# Patient Record
Sex: MALE | Race: AMERICAN INDIAN OR ALASKA NATIVE | NOT HISPANIC OR LATINO | URBAN - METROPOLITAN AREA
[De-identification: names, ages, dates, MRNs, and addresses within clinical notes are randomized per-mention and may not be internally consistent; named-entity substitution may affect disease eponyms.]

---

## 2017-01-19 ENCOUNTER — INPATIENT (INPATIENT)
Facility: HOSPITAL | Age: 32
LOS: 0 days | Discharge: HOME | End: 2017-01-20
Attending: EMERGENCY MEDICINE

## 2017-06-27 DIAGNOSIS — R07.9 CHEST PAIN, UNSPECIFIED: ICD-10-CM

## 2017-06-27 DIAGNOSIS — R07.89 OTHER CHEST PAIN: ICD-10-CM

## 2017-06-27 DIAGNOSIS — R20.0 ANESTHESIA OF SKIN: ICD-10-CM

## 2017-06-27 DIAGNOSIS — Z82.49 FAMILY HISTORY OF ISCHEMIC HEART DISEASE AND OTHER DISEASES OF THE CIRCULATORY SYSTEM: ICD-10-CM

## 2017-06-27 DIAGNOSIS — R11.0 NAUSEA: ICD-10-CM

## 2017-06-27 DIAGNOSIS — R20.2 PARESTHESIA OF SKIN: ICD-10-CM

## 2021-06-06 ENCOUNTER — OUTPATIENT (OUTPATIENT)
Dept: OUTPATIENT SERVICES | Facility: HOSPITAL | Age: 36
LOS: 1 days | Discharge: HOME | End: 2021-06-06

## 2021-06-06 DIAGNOSIS — Z11.59 ENCOUNTER FOR SCREENING FOR OTHER VIRAL DISEASES: ICD-10-CM

## 2022-02-20 ENCOUNTER — EMERGENCY (EMERGENCY)
Facility: HOSPITAL | Age: 37
LOS: 0 days | Discharge: HOME | End: 2022-02-20
Attending: STUDENT IN AN ORGANIZED HEALTH CARE EDUCATION/TRAINING PROGRAM | Admitting: STUDENT IN AN ORGANIZED HEALTH CARE EDUCATION/TRAINING PROGRAM
Payer: MEDICAID

## 2022-02-20 VITALS
RESPIRATION RATE: 16 BRPM | OXYGEN SATURATION: 99 % | HEART RATE: 71 BPM | WEIGHT: 205.91 LBS | SYSTOLIC BLOOD PRESSURE: 148 MMHG | DIASTOLIC BLOOD PRESSURE: 90 MMHG | TEMPERATURE: 98 F

## 2022-02-20 DIAGNOSIS — H53.149 VISUAL DISCOMFORT, UNSPECIFIED: ICD-10-CM

## 2022-02-20 DIAGNOSIS — H16.002 UNSPECIFIED CORNEAL ULCER, LEFT EYE: ICD-10-CM

## 2022-02-20 DIAGNOSIS — S05.02XA INJURY OF CONJUNCTIVA AND CORNEAL ABRASION WITHOUT FOREIGN BODY, LEFT EYE, INITIAL ENCOUNTER: ICD-10-CM
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
## 2022-02-20 PROCEDURE — 99283 EMERGENCY DEPT VISIT LOW MDM: CPT

## 2022-02-20 RX ORDER — POLYMYXIN B SULF/TRIMETHOPRIM 10000-1/ML
2 DROPS OPHTHALMIC (EYE) ONCE
Refills: 0 | Status: COMPLETED | OUTPATIENT
Start: 2022-02-20 | End: 2022-02-20

## 2022-02-20 RX ORDER — IBUPROFEN 200 MG
600 TABLET ORAL ONCE
Refills: 0 | Status: COMPLETED | OUTPATIENT
Start: 2022-02-20 | End: 2022-02-20

## 2022-02-20 RX ORDER — CIPROFLOXACIN HCL 0.3 %
2 DROPS OPHTHALMIC (EYE)
Qty: 1 | Refills: 1
Start: 2022-02-20 | End: 2022-03-19

## 2022-02-20 RX ADMIN — Medication 2 DROP(S): at 07:35

## 2022-02-20 RX ADMIN — Medication 600 MILLIGRAM(S): at 08:03

## 2022-02-20 NOTE — ED PROVIDER NOTE - CLINICAL SUMMARY MEDICAL DECISION MAKING FREE TEXT BOX
36-year-old male past medical history of sarcoidosis presents today with left-sided photophobia. patient notes often denies any nausea vomiting no history of increased intracranial pressure. Patient noted that he had an episode of intraocular pressure was relieved with timolol. Well appearing, NAD, non toxic. NCAT PERRLA EOMI, corneal ulcer noted on fluorescein exam 1.5 mm. Neck supple non tender normal wob cta bl rrr abdomen s nt nd no rebound no guarding WWPx4 neuro non focal. Will discharge antibiotics Ortho follow-up

## 2022-02-20 NOTE — ED PROVIDER NOTE - OBJECTIVE STATEMENT
Pt is a 37 yo M w/ pmhx of sarcoidosis presenting w/ photophobia and eye pressure for the past few days. Denies any trauma to the eye. 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  He was treated w/ eye drops in the past. He denies any HA or vision loss.

## 2022-02-20 NOTE — ED ADULT NURSE NOTE - OBJECTIVE STATEMENT
PT STATES THAT THEY HAVE A PAST MEDICAL HISTORY OF SARCOIDOSIS. PT STATES THAT HE IS CURRENTLY HAVING LIGHT SENSITIVITY IN OS. PT DENIES N/V/D AND FEVER. PT STATES THAT THEY HAVE A PAST MEDICAL HISTORY OF SARCOIDOSIS. PT STATES THAT HE IS CURRENTLY HAVING LIGHT SENSITIVITY IN OS. PT DENIES N/V/D AND FEVER. PT STATES THAT HE HAS HX OF INCREASED IOP OS.

## 2022-02-20 NOTE — ED PROVIDER NOTE - PATIENT PORTAL LINK FT
You can access the FollowMyHealth Patient Portal offered by [facility] by registering at the following website: [email or web address]. By joining [employer]’s FollowMyHealth portal, you will also be able to view your health information using other applications (apps) compatible with our system.

## 2022-02-20 NOTE — ED PROVIDER NOTE - PHYSICAL EXAMINATION
CONSTITUTIONAL: Well-developed; well-nourished; in no acute distress.   SKIN: warm, dry  HEAD: Normocephalic; atraumatic.  EYES: PERRL, EOMI, normal sclera and conjunctiva, + tearing in L eye .   Lev pressure: L eye 9,9 8 R eye 10, 10 ,9  flourescein exam : + corneal abrasion w/ jagged edges and borders   ENT: No nasal discharge; airway clear.  NECK: Supple; non tender.  CARD:  Regular rate and rhythm.   RESP: NO inc WOB , speaking in full sentences, lungs CTAB  ABD: soft ntnd  EXT: Normal ROM.  no LE edema no unilateral leg swelling  NEURO: Alert, oriented, grossly unremarkable  PSYCH: Cooperative, appropriate.

## 2022-02-20 NOTE — ED PROVIDER NOTE - NSFOLLOWUPCLINICS_GEN_ALL_ED_FT
[facility]  Ophthalmolgy  [address], [address]  [place] [address]  Phone: [phone]  Fax:   Follow Up Time: Urgent

## 2022-02-20 NOTE — ED PROVIDER NOTE - NSFOLLOWUPINSTRUCTIONS_ED_ALL_ED_FT
Can take motrin/ibuprofen 600mg every 6 hours as needed       Corneal Ulcer    A corneal ulcer is an open sore on the cornea. The cornea is the clear covering at the front and center of the eye.     CAUSES  Most corneal ulcers are caused by infection, but there are other causes as well.    Bacterial infection. A bacterial infection can occur and cause a corneal ulcer if:  Contact lenses are worn too long (especially overnight) or are not properly cared for.  An eye injury occurs, allowing bacteria to infect the area of injury.  Viral infection. A viral infection can occur and cause a corneal ulcer if:  The eye becomes infected with a virus, such as the herpes simplex (cold sore) virus, chickenpox virus, or shingles virus.  Fungal infection. A fungal infection can occur and cause a corneal ulcer if:  An eye injury resulted from contact with a plant or plant material.  An anti-inflammatory eye drop is overused.  You have a weakened immune system.  Contact lenses are improperly cared for or become infected.  Foreign bodies in the eye, such as sand, glass, or small pieces of glass or metal.   Dry eyes.  Certain disorders that prevent eyelids from closing completely, such as Bell's palsy.  Contact lenses, especially extended-wear soft contact lenses. Contact lenses can:  Scratch the cornea's surface, allowing bacteria to enter the scratch.  Trap dirt underneath the contact lens, which can scratch the cornea.  Chamberlain bacteria and fungi, making it more likely for bacterial infections to occur.  Block oxygen from the cornea, making it more likely for infections to occur.    SYMPTOMS  Eye pain that is often severe.  Blurry vision.  Light sensitivity.  Pus or thick discharge coming from your eye.  Eye redness.  Feeling like something is in your eye.  Watery or itchy eye.  Burning or stinging feeling.    Some ulcers that are very big may be seen as a white spot on the cornea.    DIAGNOSIS  An eye exam will be performed. Your health care provider may use a special kind of microscope (slit lamp) to look at the cornea. Eye drops may be put into the eye to make the ulcer easier to see. If it is suspected that an infection caused the corneal ulcer, tissue samples or cultures from the eye may be taken. Numbing eye drops will be given before any samples or cultures are taken. The samples or cultures will be examined in the lab to check for bacteria, viruses, or fungi.    TREATMENT  Treatment of the corneal ulcer depends on the cause. If your ulcer is severe, you may be given antibiotic eye drops up until your health care provider knows the test results. Other treatments can include:    Antibacterial, antiviral, or antifungal eye drops or ointment.  Removing the foreign body that caused the eye injury.  Artificial tears or a bandage contact lens if severe dry eyes caused the corneal ulcer.  Over-the-counter or prescription pain medicine.  Steroidal eye drops if the eye is inflamed and swollen.  Antibiotic medicines by mouth.  An injection of medicine under the thin membrane covering the eyeball (conjunctiva). This allows medicine to reach the ulcer in high doses.  Eye patching to reduce irritation from blinking and bright light. An eye patch may not be given if the ulcer was caused by a bacterial infection.    If the corneal ulcer causes a scar on the cornea that interferes with vision, hospitalization and surgery may be needed to replace the cornea (corneal transplant).    HOME CARE INSTRUCTIONS  If prescribed, use your antibiotic pills, eye drops, or ointment as directed. Continue using them even if you start to feel better. You may have to apply eye drops as often as every few minutes to every hour, for days. It may be necessary to set your alarm clock every few minutes to every hour during the night. This is absolutely necessary.   Only take over-the-counter or prescription medicines as directed by your health care provider.   Apply artificial tears as needed if you have dry eyes.   Do not touch or rub your eye, because this may increase the irritation and spread the infection.  Avoid wearing eye makeup.  Stay in a dark room and use sunglasses if you have light sensitivity.  Apply cool packs to your eye to relieve discomfort and swelling.  If your eye is patched, you should not drive or use machinery. You will have reduced side vision and ability to  distance.  Do not drive or operate machinery until approved by your health care provider. Your ability to  distances is impaired.  Follow up with your health care provider as directed.   Do not wear contact lenses until your health care provider approves. If you normally wear contact lenses, follow these general rules to avoid the risk of a corneal ulcer:   Do not wear contact lenses while you sleep.   Wash your hands before removing contact lenses.  Properly sterilize and store your contact lenses.  Regularly clean your contact lens case.  Do not use your saliva or tap water to clean or wet your contact lenses.  Remove your contact lenses if your eye becomes irritated. You may put them back in once your eyes feel better.    SEEK IMMEDIATE MEDICAL CARE IF:  You notice a change in your vision.  Your pain is getting worse, not better.   You have increasing discharge from the eye.    MAKE SURE YOU:  Understand these instructions.  Will watch your condition.  Will get help right away if you are not doing well or get worse.    ADDITIONAL NOTES AND INSTRUCTIONS    Please follow up with your Primary MD in 24-48 hr.  Seek immediate medical care for any new/worsening signs or symptoms.

## 2022-02-20 NOTE — ED PROVIDER NOTE - NS ED ROS FT
Constitutional: (-) fever  Eyes/ENT: ((-) epistaxis  Cardiovascular: (-) chest pain, (-) syncope  Respiratory: (-) cough, (-) shortness of breath  Gastrointestinal: (-) vomiting, (-) diarrhea  Musculoskeletal: (-) neck pain, (-) back pain, (-) joint pain  Integumentary: (-) rash, (-) edema  Neurological: (-) headache, (-) altered mental status  Psychiatric: (-) hallucinations  Allergic/Immunologic: (-) pruritus

## 2022-02-24 PROBLEM — Z00.00 ENCOUNTER FOR PREVENTIVE HEALTH EXAMINATION: Status: ACTIVE | Noted: 2022-02-24

## 2022-02-25 ENCOUNTER — APPOINTMENT (OUTPATIENT)
Dept: OPHTHALMOLOGY | Facility: CLINIC | Age: 37
End: 2022-02-25

## 2022-02-25 ENCOUNTER — OUTPATIENT (OUTPATIENT)
Dept: OUTPATIENT SERVICES | Facility: HOSPITAL | Age: 37
LOS: 1 days | Discharge: HOME | End: 2022-02-25
Payer: MEDICAID

## 2022-02-25 PROCEDURE — 92002 INTRM OPH EXAM NEW PATIENT: CPT

## 2022-03-04 DIAGNOSIS — H30.20 POSTERIOR CYCLITIS, UNSPECIFIED EYE: ICD-10-CM

## 2022-03-04 DIAGNOSIS — H25.10 AGE-RELATED NUCLEAR CATARACT, UNSPECIFIED EYE: ICD-10-CM

## 2022-03-04 DIAGNOSIS — H04.129 DRY EYE SYNDROME OF UNSPECIFIED LACRIMAL GLAND: ICD-10-CM

## 2022-04-29 ENCOUNTER — LABORATORY RESULT (OUTPATIENT)
Age: 37
End: 2022-04-29

## 2025-04-10 ENCOUNTER — NON-APPOINTMENT (OUTPATIENT)
Age: 40
End: 2025-04-10

## 2025-04-10 ENCOUNTER — APPOINTMENT (OUTPATIENT)
Facility: CLINIC | Age: 40
End: 2025-04-10
Payer: MEDICAID

## 2025-04-10 PROCEDURE — 92134 CPTRZ OPH DX IMG PST SGM RTA: CPT

## 2025-04-10 PROCEDURE — 92004 COMPRE OPH EXAM NEW PT 1/>: CPT

## 2025-04-28 ENCOUNTER — APPOINTMENT (OUTPATIENT)
Facility: CLINIC | Age: 40
End: 2025-04-28
Payer: MEDICAID

## 2025-04-28 ENCOUNTER — NON-APPOINTMENT (OUTPATIENT)
Age: 40
End: 2025-04-28

## 2025-04-28 PROCEDURE — 67028 INJECTION EYE DRUG: CPT | Mod: LT

## 2025-04-28 PROCEDURE — 76512 OPH US DX B-SCAN: CPT | Mod: LT

## 2025-04-28 PROCEDURE — 92201 OPSCPY EXTND RTA DRAW UNI/BI: CPT

## 2025-04-28 PROCEDURE — 99214 OFFICE O/P EST MOD 30 MIN: CPT | Mod: 25

## 2025-04-28 PROCEDURE — 92134 CPTRZ OPH DX IMG PST SGM RTA: CPT

## 2025-05-29 ENCOUNTER — APPOINTMENT (OUTPATIENT)
Facility: CLINIC | Age: 40
End: 2025-05-29

## 2025-05-29 ENCOUNTER — NON-APPOINTMENT (OUTPATIENT)
Age: 40
End: 2025-05-29

## 2025-05-29 PROCEDURE — 99214 OFFICE O/P EST MOD 30 MIN: CPT | Mod: 25

## 2025-05-29 PROCEDURE — 67028 INJECTION EYE DRUG: CPT | Mod: LT

## 2025-05-29 PROCEDURE — 92134 CPTRZ OPH DX IMG PST SGM RTA: CPT

## 2025-05-29 PROCEDURE — 92202 OPSCPY EXTND ON/MAC DRAW: CPT

## 2025-07-14 ENCOUNTER — NON-APPOINTMENT (OUTPATIENT)
Age: 40
End: 2025-07-14

## 2025-07-14 ENCOUNTER — APPOINTMENT (OUTPATIENT)
Facility: CLINIC | Age: 40
End: 2025-07-14

## 2025-07-14 PROCEDURE — 92134 CPTRZ OPH DX IMG PST SGM RTA: CPT

## 2025-07-14 PROCEDURE — 67028 INJECTION EYE DRUG: CPT | Mod: LT

## 2025-07-14 PROCEDURE — 92014 COMPRE OPH EXAM EST PT 1/>: CPT | Mod: 25
